# Patient Record
Sex: MALE | Race: WHITE | NOT HISPANIC OR LATINO | Employment: STUDENT | URBAN - METROPOLITAN AREA
[De-identification: names, ages, dates, MRNs, and addresses within clinical notes are randomized per-mention and may not be internally consistent; named-entity substitution may affect disease eponyms.]

---

## 2023-03-18 ENCOUNTER — HOSPITAL ENCOUNTER (EMERGENCY)
Facility: HOSPITAL | Age: 28
Discharge: HOME | End: 2023-03-18
Attending: EMERGENCY MEDICINE
Payer: COMMERCIAL

## 2023-03-18 VITALS
OXYGEN SATURATION: 96 % | DIASTOLIC BLOOD PRESSURE: 56 MMHG | RESPIRATION RATE: 17 BRPM | HEIGHT: 72 IN | WEIGHT: 160 LBS | TEMPERATURE: 99 F | HEART RATE: 61 BPM | SYSTOLIC BLOOD PRESSURE: 117 MMHG | BODY MASS INDEX: 21.67 KG/M2

## 2023-03-18 DIAGNOSIS — J36 ABSCESS, PERITONSILLAR: Primary | ICD-10-CM

## 2023-03-18 LAB
ALBUMIN SERPL BCP-MCNC: 4 G/DL (ref 3.5–5.2)
ALP SERPL-CCNC: 109 U/L (ref 55–135)
ALT SERPL W/O P-5'-P-CCNC: 27 U/L (ref 10–44)
ANION GAP SERPL CALC-SCNC: 13 MMOL/L (ref 8–16)
AST SERPL-CCNC: 29 U/L (ref 10–40)
BASOPHILS # BLD AUTO: 0.04 K/UL (ref 0–0.2)
BASOPHILS NFR BLD: 0.3 % (ref 0–1.9)
BILIRUB SERPL-MCNC: 0.4 MG/DL (ref 0.1–1)
BUN SERPL-MCNC: 11 MG/DL (ref 6–20)
CALCIUM SERPL-MCNC: 9.8 MG/DL (ref 8.7–10.5)
CHLORIDE SERPL-SCNC: 101 MMOL/L (ref 95–110)
CO2 SERPL-SCNC: 24 MMOL/L (ref 23–29)
CREAT SERPL-MCNC: 0.9 MG/DL (ref 0.5–1.4)
DIFFERENTIAL METHOD: ABNORMAL
EOSINOPHIL # BLD AUTO: 0 K/UL (ref 0–0.5)
EOSINOPHIL NFR BLD: 0.2 % (ref 0–8)
ERYTHROCYTE [DISTWIDTH] IN BLOOD BY AUTOMATED COUNT: 11.9 % (ref 11.5–14.5)
EST. GFR  (NO RACE VARIABLE): >60 ML/MIN/1.73 M^2
GLUCOSE SERPL-MCNC: 93 MG/DL (ref 70–110)
GROUP A STREP, MOLECULAR: NEGATIVE
HCT VFR BLD AUTO: 45.3 % (ref 40–54)
HCV AB SERPL QL IA: NORMAL
HETEROPH AB SERPL QL IA: NEGATIVE
HGB BLD-MCNC: 15.6 G/DL (ref 14–18)
HIV 1+2 AB+HIV1 P24 AG SERPL QL IA: NORMAL
IMM GRANULOCYTES # BLD AUTO: 0.03 K/UL (ref 0–0.04)
IMM GRANULOCYTES NFR BLD AUTO: 0.2 % (ref 0–0.5)
LYMPHOCYTES # BLD AUTO: 2.3 K/UL (ref 1–4.8)
LYMPHOCYTES NFR BLD: 18.6 % (ref 18–48)
MCH RBC QN AUTO: 30.4 PG (ref 27–31)
MCHC RBC AUTO-ENTMCNC: 34.4 G/DL (ref 32–36)
MCV RBC AUTO: 88 FL (ref 82–98)
MONOCYTES # BLD AUTO: 1.1 K/UL (ref 0.3–1)
MONOCYTES NFR BLD: 9.1 % (ref 4–15)
NEUTROPHILS # BLD AUTO: 8.8 K/UL (ref 1.8–7.7)
NEUTROPHILS NFR BLD: 71.6 % (ref 38–73)
NRBC BLD-RTO: 0 /100 WBC
PLATELET # BLD AUTO: 421 K/UL (ref 150–450)
PMV BLD AUTO: 9 FL (ref 9.2–12.9)
POTASSIUM SERPL-SCNC: 3.9 MMOL/L (ref 3.5–5.1)
PROT SERPL-MCNC: 8.1 G/DL (ref 6–8.4)
RBC # BLD AUTO: 5.14 M/UL (ref 4.6–6.2)
SARS-COV-2 RDRP RESP QL NAA+PROBE: NEGATIVE
SODIUM SERPL-SCNC: 138 MMOL/L (ref 136–145)
WBC # BLD AUTO: 12.22 K/UL (ref 3.9–12.7)

## 2023-03-18 PROCEDURE — 25500020 PHARM REV CODE 255: Performed by: EMERGENCY MEDICINE

## 2023-03-18 PROCEDURE — 80053 COMPREHEN METABOLIC PANEL: CPT | Performed by: PHYSICIAN ASSISTANT

## 2023-03-18 PROCEDURE — 96375 TX/PRO/DX INJ NEW DRUG ADDON: CPT | Mod: 59

## 2023-03-18 PROCEDURE — 86803 HEPATITIS C AB TEST: CPT | Performed by: PHYSICIAN ASSISTANT

## 2023-03-18 PROCEDURE — 87651 STREP A DNA AMP PROBE: CPT | Performed by: PHYSICIAN ASSISTANT

## 2023-03-18 PROCEDURE — 96366 THER/PROPH/DIAG IV INF ADDON: CPT

## 2023-03-18 PROCEDURE — 63600175 PHARM REV CODE 636 W HCPCS: Performed by: PHYSICIAN ASSISTANT

## 2023-03-18 PROCEDURE — U0002 COVID-19 LAB TEST NON-CDC: HCPCS | Performed by: PHYSICIAN ASSISTANT

## 2023-03-18 PROCEDURE — 99284 PR EMERGENCY DEPT VISIT,LEVEL IV: ICD-10-PCS | Mod: CS,,, | Performed by: PHYSICIAN ASSISTANT

## 2023-03-18 PROCEDURE — 25000003 PHARM REV CODE 250: Performed by: EMERGENCY MEDICINE

## 2023-03-18 PROCEDURE — 87389 HIV-1 AG W/HIV-1&-2 AB AG IA: CPT | Performed by: PHYSICIAN ASSISTANT

## 2023-03-18 PROCEDURE — 63600175 PHARM REV CODE 636 W HCPCS: Performed by: EMERGENCY MEDICINE

## 2023-03-18 PROCEDURE — 86308 HETEROPHILE ANTIBODY SCREEN: CPT | Performed by: PHYSICIAN ASSISTANT

## 2023-03-18 PROCEDURE — 99285 EMERGENCY DEPT VISIT HI MDM: CPT | Mod: 25

## 2023-03-18 PROCEDURE — 96365 THER/PROPH/DIAG IV INF INIT: CPT

## 2023-03-18 PROCEDURE — 25000003 PHARM REV CODE 250: Performed by: STUDENT IN AN ORGANIZED HEALTH CARE EDUCATION/TRAINING PROGRAM

## 2023-03-18 PROCEDURE — 85025 COMPLETE CBC W/AUTO DIFF WBC: CPT | Performed by: PHYSICIAN ASSISTANT

## 2023-03-18 PROCEDURE — 99284 EMERGENCY DEPT VISIT MOD MDM: CPT | Mod: CS,,, | Performed by: PHYSICIAN ASSISTANT

## 2023-03-18 RX ORDER — DEXAMETHASONE SODIUM PHOSPHATE 4 MG/ML
8 INJECTION, SOLUTION INTRA-ARTICULAR; INTRALESIONAL; INTRAMUSCULAR; INTRAVENOUS; SOFT TISSUE
Status: COMPLETED | OUTPATIENT
Start: 2023-03-18 | End: 2023-03-18

## 2023-03-18 RX ORDER — AMOXICILLIN AND CLAVULANATE POTASSIUM 875; 125 MG/1; MG/1
1 TABLET, FILM COATED ORAL EVERY 12 HOURS
Qty: 28 TABLET | Refills: 0 | Status: SHIPPED | OUTPATIENT
Start: 2023-03-18 | End: 2023-04-01

## 2023-03-18 RX ORDER — KETOROLAC TROMETHAMINE 30 MG/ML
15 INJECTION, SOLUTION INTRAMUSCULAR; INTRAVENOUS
Status: COMPLETED | OUTPATIENT
Start: 2023-03-18 | End: 2023-03-18

## 2023-03-18 RX ORDER — METHYLPREDNISOLONE 4 MG/1
TABLET ORAL
Qty: 21 EACH | Refills: 0 | Status: SHIPPED | OUTPATIENT
Start: 2023-03-18 | End: 2023-04-08

## 2023-03-18 RX ORDER — DIPHENHYDRAMINE HYDROCHLORIDE 50 MG/ML
50 INJECTION INTRAMUSCULAR; INTRAVENOUS
Status: COMPLETED | OUTPATIENT
Start: 2023-03-18 | End: 2023-03-18

## 2023-03-18 RX ORDER — LIDOCAINE HYDROCHLORIDE AND EPINEPHRINE 10; 10 MG/ML; UG/ML
20 INJECTION, SOLUTION INFILTRATION; PERINEURAL ONCE
Status: COMPLETED | OUTPATIENT
Start: 2023-03-18 | End: 2023-03-18

## 2023-03-18 RX ORDER — IBUPROFEN 800 MG/1
800 TABLET ORAL EVERY 6 HOURS PRN
Qty: 20 TABLET | Refills: 0 | Status: SHIPPED | OUTPATIENT
Start: 2023-03-18

## 2023-03-18 RX ADMIN — AMPICILLIN 2 G: 2 INJECTION, POWDER, FOR SOLUTION INTRAMUSCULAR; INTRAVENOUS at 07:03

## 2023-03-18 RX ADMIN — DIPHENHYDRAMINE HYDROCHLORIDE 50 MG: 50 INJECTION, SOLUTION INTRAMUSCULAR; INTRAVENOUS at 06:03

## 2023-03-18 RX ADMIN — LIDOCAINE HYDROCHLORIDE AND EPINEPHRINE 20 ML: 10; 10 INJECTION, SOLUTION INFILTRATION; PERINEURAL at 09:03

## 2023-03-18 RX ADMIN — KETOROLAC TROMETHAMINE 15 MG: 30 INJECTION, SOLUTION INTRAMUSCULAR; INTRAVENOUS at 07:03

## 2023-03-18 RX ADMIN — IOHEXOL 75 ML: 350 INJECTION, SOLUTION INTRAVENOUS at 06:03

## 2023-03-18 RX ADMIN — DEXAMETHASONE SODIUM PHOSPHATE 8 MG: 4 INJECTION INTRA-ARTICULAR; INTRALESIONAL; INTRAMUSCULAR; INTRAVENOUS; SOFT TISSUE at 04:03

## 2023-03-18 NOTE — Clinical Note
"Indra"Kyree Kaur was seen and treated in our emergency department on 3/18/2023.  He may return to school on 03/21/2023.      If you have any questions or concerns, please don't hesitate to call.      Janneth Lanier PA-C"

## 2023-03-18 NOTE — ED TRIAGE NOTES
Indra Kaur, a 27 y.o. 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     Triage note:  Chief Complaint   Patient presents with    Sore Throat     Oral swelling and pain, pain with swallowing      Review of patient's allergies indicates:  No Known Allergies  No past medical history on file.

## 2023-03-18 NOTE — Clinical Note
"Indra"Kyree Kaur was seen and treated in our emergency department on 3/18/2023.  He may return to work on 03/21/2023.       If you have any questions or concerns, please don't hesitate to call.      Janneth Lanier PA-C"

## 2023-03-19 NOTE — DISCHARGE INSTRUCTIONS
Start Augmentin every 12 hours for the next 14 days.    You can start Medrol Dosepak in the morning if you feel like you have swelling.  Please complete steroid Dosepak if you start.    Take ibuprofen 800 mg every 6 hours as needed with food for anti-inflammatory relief.  You can take acetaminophen/tylenol 650 mg every 6 hours or 1000 mg every 8 hours for added relief.  Follow up with ENT as needed and PCP.   Return to the ER for new or worsening symptoms.

## 2023-03-19 NOTE — SUBJECTIVE & OBJECTIVE
Medications:  Continuous Infusions:  Scheduled Meds:   LIDOcaine-EPINEPHrine 1%-1:100,000  20 mL Other Once     PRN Meds:     No current facility-administered medications on file prior to encounter.     No current outpatient medications on file prior to encounter.       Review of patient's allergies indicates:   Allergen Reactions    Chickpea     Peanuts [peanut (legumes)]     Shellfish containing products        No past medical history on file.  No past surgical history on file.  Family History    None       Tobacco Use    Smoking status: Not on file    Smokeless tobacco: Not on file   Substance and Sexual Activity    Alcohol use: Not on file    Drug use: Not on file    Sexual activity: Not on file     Review of Systems   Constitutional:  Negative for chills and fever.   HENT:  Positive for sore throat and trouble swallowing.    Eyes:  Negative for pain and redness.   Respiratory:  Negative for cough and stridor.    Cardiovascular:  Negative for chest pain and palpitations.   Gastrointestinal:  Negative for diarrhea and vomiting.   Endocrine: Negative for cold intolerance and heat intolerance.   Genitourinary:  Negative for flank pain and hematuria.   Musculoskeletal:  Negative for neck pain and neck stiffness.   Skin:  Negative for pallor and rash.   Allergic/Immunologic: Negative for environmental allergies and immunocompromised state.   Neurological:  Negative for seizures and headaches.   Hematological:  Negative for adenopathy. Does not bruise/bleed easily.   Psychiatric/Behavioral:  Negative for agitation and confusion.    Objective:     Vital Signs (Most Recent):  Temp: 98.5 °F (36.9 °C) (03/18/23 1905)  Pulse: 61 (03/18/23 2101)  Resp: 17 (03/18/23 1905)  BP: (!) 117/56 (03/18/23 2101)  SpO2: 96 % (03/18/23 2101)   Vital Signs (24h Range):  Temp:  [98.5 °F (36.9 °C)-98.7 °F (37.1 °C)] 98.5 °F (36.9 °C)  Pulse:  [] 61  Resp:  [17-18] 17  SpO2:  [96 %-100 %] 96 %  BP: (117-139)/(56-94) 117/56      Weight: 72.6 kg (160 lb)  Body mass index is 21.7 kg/m².        Physical Exam  Gen: in no acute distress  HENT: left peritonsillar erythema with induration, +tonsillar exudate, neck is soft  CV/Resp: breathing comfortably on room air, regular heart rate  Abd: flat  Neuro: aaox3, moves all extremities       Significant Labs:  CBC:   Recent Labs   Lab 03/18/23  1635   WBC 12.22   RBC 5.14   HGB 15.6   HCT 45.3      MCV 88   MCH 30.4   MCHC 34.4       Significant Diagnostics:  CT: I have reviewed all pertinent results/findings within the past 24 hours and my personal findings are:  Left peritonsillar abscess

## 2023-03-19 NOTE — ASSESSMENT & PLAN NOTE
The patient is a 27 year old male with a left PTA s/p bedside I&D.    - Okay to discharge with PO antibiotics  - Follow up as needed with ENT  - Strict return precautions given to patient      Procedure: Incision and Drainage of peritonsillar abscess (PTA), left.  Pre Procedure DX: PTA left  Post-procedure Dx: same  Surgeon: Tesha Cornell  Anesthesia: Lidocaine with epinephrine 1:100,000   Consent: Verbal consent was obtained, outlining the risks, benefits, and alternatives.   Procedure in detail: The patient's left tonsillar pillar was injected with 3 cc of 1% Lidocaine with epinephrine 1:100,000. An 18 gauge needle was inserted and aspirated with return of 2 cc of pus. A linear incision was then made after anesthesia was achieved. Blunt dissection using hemostat opened the incision with return of further pus.   Complications: None; The patient tolerated the procedure well.  Dispo: stable, improved

## 2023-03-19 NOTE — CONSULTS
[facility] - Emergency Dept  Otorhinolaryngology-Head & Neck Surgery  Consult Note    Patient Name: Indra Kaur  MRN: [id number]  Code Status: No Order  Admission Date: 3/18/2023  Hospital Length of Stay: 0 days  Attending Physician: Bonita Robbins MD  Primary Care Provider: [facility]    Patient information was obtained from patient and ER records.     Inpatient consult to ENT  Consult performed by: Tesha Cornell MD  Consult ordered by: Janneth Lanier PA-C        Subjective:     Chief Complaint/Reason for Admission: PTA    History of Present Illness: The patient is a healthy 27 year old male who presents with worsening sore throat and odynophagia over the past few days. He denies any prior history of tonsillar abscess. He has not been on antibiotics prior to this ED admission.       Medications:  Continuous Infusions:  Scheduled Meds:   LIDOcaine-EPINEPHrine 1%-1:100,000  20 mL Other Once     PRN Meds:     No current facility-administered medications on file prior to encounter.     No current outpatient medications on file prior to encounter.       Review of patient's allergies indicates:   Allergen Reactions    Chickpea     Peanuts [peanut (legumes)]     Shellfish containing products        No past medical history on file.  No past surgical history on file.  Family History    None       Tobacco Use    Smoking status: Not on file    Smokeless tobacco: Not on file   Substance and Sexual Activity    Alcohol use: Not on file    Drug use: Not on file    Sexual activity: Not on file     Review of Systems   Constitutional:  Negative for chills and fever.   HENT:  Positive for sore throat and trouble swallowing.    Eyes:  Negative for pain and redness.   Respiratory:  Negative for cough and stridor.    Cardiovascular:  Negative for chest pain and palpitations.   Gastrointestinal:  Negative for diarrhea and vomiting.   Endocrine: Negative for cold intolerance and heat intolerance.   Genitourinary:   Negative for flank pain and hematuria.   Musculoskeletal:  Negative for neck pain and neck stiffness.   Skin:  Negative for pallor and rash.   Allergic/Immunologic: Negative for environmental allergies and immunocompromised state.   Neurological:  Negative for seizures and headaches.   Hematological:  Negative for adenopathy. Does not bruise/bleed easily.   Psychiatric/Behavioral:  Negative for agitation and confusion.    Objective:     Vital Signs (Most Recent):  Temp: 98.5 °F (36.9 °C) (03/18/23 1905)  Pulse: 61 (03/18/23 2101)  Resp: 17 (03/18/23 1905)  BP: (!) 117/56 (03/18/23 2101)  SpO2: 96 % (03/18/23 2101)   Vital Signs (24h Range):  Temp:  [98.5 °F (36.9 °C)-98.7 °F (37.1 °C)] 98.5 °F (36.9 °C)  Pulse:  [] 61  Resp:  [17-18] 17  SpO2:  [96 %-100 %] 96 %  BP: (117-139)/(56-94) 117/56     Weight: 72.6 kg (160 lb)  Body mass index is 21.7 kg/m².        Physical Exam  Gen: in no acute distress  HENT: left peritonsillar erythema with induration, +tonsillar exudate, neck is soft  CV/Resp: breathing comfortably on room air, regular heart rate  Abd: flat  Neuro: aaox3, moves all extremities       Significant Labs:  CBC:   Recent Labs   Lab 03/18/23  1635   WBC 12.22   RBC 5.14   HGB 15.6   HCT 45.3      MCV 88   MCH 30.4   MCHC 34.4       Significant Diagnostics:  CT: I have reviewed all pertinent results/findings within the past 24 hours and my personal findings are:  Left peritonsillar abscess      Assessment/Plan:     Peritonsillar abscess  The patient is a 27 year old male with a left PTA s/p bedside I&D.    - Okay to discharge with PO antibiotics  - Follow up as needed with ENT  - Strict return precautions given to patient      Procedure: Incision and Drainage of peritonsillar abscess (PTA), left.  Pre Procedure DX: PTA left  Post-procedure Dx: same  Surgeon: Tesha Cornell  Anesthesia: Lidocaine with epinephrine 1:100,000   Consent: Verbal consent was obtained, outlining the risks,  benefits, and alternatives.   Procedure in detail: The patient's left tonsillar pillar was injected with 3 cc of 1% Lidocaine with epinephrine 1:100,000. An 18 gauge needle was inserted and aspirated with return of 2 cc of pus. A linear incision was then made after anesthesia was achieved. Blunt dissection using hemostat opened the incision with return of further pus.   Complications: None; The patient tolerated the procedure well.  Dispo: stable, improved        VTE Risk Mitigation (From admission, onward)    None            Tesha Cornell MD  Otorhinolaryngology-Head & Neck Surgery  [facility] - Emergency Dept

## 2023-04-13 NOTE — HPI
The patient is a healthy 27 year old male who presents with worsening sore throat and odynophagia over the past few days. He denies any prior history of tonsillar abscess. He has not been on antibiotics prior to this ED admission.   
48